# Patient Record
Sex: MALE | Race: WHITE | Employment: FULL TIME | URBAN - METROPOLITAN AREA
[De-identification: names, ages, dates, MRNs, and addresses within clinical notes are randomized per-mention and may not be internally consistent; named-entity substitution may affect disease eponyms.]

---

## 2020-05-29 ENCOUNTER — VIRTUAL VISIT (OUTPATIENT)
Dept: FAMILY MEDICINE | Facility: CLINIC | Age: 30
End: 2020-05-29
Payer: COMMERCIAL

## 2020-05-29 DIAGNOSIS — E55.9 VITAMIN D DEFICIENCY: ICD-10-CM

## 2020-05-29 DIAGNOSIS — R29.898 UPPER EXTREMITY WEAKNESS: Primary | ICD-10-CM

## 2020-05-29 DIAGNOSIS — G62.9 NEUROPATHY: ICD-10-CM

## 2020-05-29 DIAGNOSIS — G43.909 HEMICRANIA: ICD-10-CM

## 2020-05-29 PROCEDURE — 99204 OFFICE O/P NEW MOD 45 MIN: CPT | Mod: 95 | Performed by: FAMILY MEDICINE

## 2020-05-29 RX ORDER — PAROXETINE 10 MG/1
TABLET, FILM COATED ORAL
COMMUNITY
Start: 2020-03-10

## 2020-05-29 RX ORDER — TRAZODONE HYDROCHLORIDE 100 MG/1
1 TABLET ORAL
COMMUNITY
Start: 2019-07-25

## 2020-05-29 RX ORDER — METOPROLOL SUCCINATE 25 MG/1
1 TABLET, EXTENDED RELEASE ORAL
COMMUNITY
Start: 2019-07-01

## 2020-05-29 NOTE — PROGRESS NOTES
"Min Solorio is a 29 year old male who is being evaluated via a billable video visit.      The patient has been notified of following:     \"This video visit will be conducted via a call between you and your physician/provider. We have found that certain health care needs can be provided without the need for an in-person physical exam.  This service lets us provide the care you need with a video conversation.  If a prescription is necessary we can send it directly to your pharmacy.  If lab work is needed we can place an order for that and you can then stop by our lab to have the test done at a later time.    Video visits are billed at different rates depending on your insurance coverage.  Please reach out to your insurance provider with any questions.    If during the course of the call the physician/provider feels a video visit is not appropriate, you will not be charged for this service.\"    Patient has given verbal consent for Video visit? Yes    How would you like to obtain your AVS? Kavita    Patient would like the video invitation sent by: Send to e-mail at: [email or web address]    Will anyone else be joining your video visit? No      Subjective     Min Solorio is a 29 year old male who presents today via video visit for the following health issues:    HPI  Headaches      Duration: x 3 weeks     Description  Location: unilateral in the left frontal area, unilateral in the left occipital area   Character: throbbing pain in the front , squeezing pain in back   Frequency:  Intermittent -usually coincides when neck pain and dizziness       Intensity:  moderate, severe    Accompanying signs and symptoms: would like a CT imaging     Precipitating or Alleviating factors:  Nausea/vomiting: occasionally  Nausea   Dizziness: always- x years   Weakness or numbness: usually- losing  in the fingers   Visual changes: none    History  Head trauma: no   Family history of migraines: no   Previous tests for headaches: " no   Neurologist evaluations: no     Able to do daily activities when headache present: no, needs to rest   Wake with headaches: no   Daily pain medication use: YES- advil   Any changes in: n/a     Precipitating or Alleviating factors (light/sound/sleep/caffeine):     Therapies tried and outcome: Ibuprofen (Advil, Motrin)    Outcome - usually effective  Frequent/daily pain medication use: no           Video Start Time: 12:12      There is no problem list on file for this patient.    No past surgical history on file.    Social History     Tobacco Use     Smoking status: Not on file   Substance Use Topics     Alcohol use: Not on file     No family history on file.      Current Outpatient Medications   Medication Sig Dispense Refill     metoprolol succinate ER (TOPROL-XL) 25 MG 24 hr tablet Take 1 tablet by mouth       PARoxetine (PAXIL) 10 MG tablet        traZODone (DESYREL) 100 MG tablet Take 1 tablet by mouth       No Known Allergies  No lab results found.   BP Readings from Last 3 Encounters:   No data found for BP    Wt Readings from Last 3 Encounters:   No data found for Wt                    Reviewed and updated as needed this visit by Provider         Review of Systems   Constitutional, HEENT, cardiovascular, pulmonary, gi and gu systems are negative, except as otherwise noted.      Objective    There were no vitals taken for this visit.  There is no height or weight on file to calculate BMI.  Physical Exam     GENERAL: Healthy, alert and no distress  EYES: Eyes grossly normal to inspection.  No discharge or erythema, or obvious scleral/conjunctival abnormalities.  RESP: No audible wheeze, cough, or visible cyanosis.  No visible retractions or increased work of breathing.    SKIN: Visible skin clear. No significant rash, abnormal pigmentation or lesions.  NEURO: Cranial nerves grossly intact.  Mentation and speech appropriate for age.  PSYCH: Mentation appears normal, affect normal/bright, judgement and  insight intact, normal speech and appearance well-groomed.      Diagnostic Test Results:  Labs reviewed in Epic        Assessment & Plan       ICD-10-CM    1. Upper extremity weakness  R29.898 CBC with platelets and differential     Ferritin     Iron and iron binding capacity     TSH with free T4 reflex     Vitamin D Deficiency     CT Head w/o Contrast   2. Hemicrania  G43.909 CBC with platelets and differential     Ferritin     Iron and iron binding capacity     TSH with free T4 reflex     Vitamin D Deficiency     CT Head w/o Contrast   3. Vitamin D deficiency  E55.9 CBC with platelets and differential     Ferritin     Iron and iron binding capacity     TSH with free T4 reflex     Vitamin D Deficiency     CT Head w/o Contrast   4. Neuropathy  G62.9 CBC with platelets and differential     Ferritin     Iron and iron binding capacity     TSH with free T4 reflex     Vitamin D Deficiency     CT Head w/o Contrast      started having HA for 2-3 months with worsening weakness on Lt upper extremities with strngth and jnubmness   Has blurry visit and next stiffness on left side  Has past h/o low vitamin D  Will have him to check lab and head CT for further evaluation     FUTURE APPOINTMENTS:       - Follow-up visit in 2 weeks after CT and lab done, will consider starting migraine med if indicated     No follow-ups on file.    J Luis Cordova MD  [facility]      Video-Visit Details    Type of service:  Video Visit    Video End Time:12:32    Originating Location (pt. Location): Home    Distant Location (provider location):  [id number]     Platform used for Video Visit: [email or web address]    No follow-ups on file.       J Luis Cordova MD

## 2020-06-03 DIAGNOSIS — E55.9 VITAMIN D DEFICIENCY: ICD-10-CM

## 2020-06-03 DIAGNOSIS — G62.9 NEUROPATHY: ICD-10-CM

## 2020-06-03 DIAGNOSIS — G43.909 HEMICRANIA: ICD-10-CM

## 2020-06-03 DIAGNOSIS — R29.898 UPPER EXTREMITY WEAKNESS: ICD-10-CM

## 2020-06-03 LAB
BASOPHILS # BLD AUTO: 0 10E9/L (ref 0–0.2)
BASOPHILS NFR BLD AUTO: 0.7 %
DIFFERENTIAL METHOD BLD: NORMAL
EOSINOPHIL # BLD AUTO: 0.1 10E9/L (ref 0–0.7)
EOSINOPHIL NFR BLD AUTO: 2.2 %
ERYTHROCYTE [DISTWIDTH] IN BLOOD BY AUTOMATED COUNT: 12 % (ref 10–15)
HCT VFR BLD AUTO: 43.1 % (ref 40–53)
HGB BLD-MCNC: 15.1 G/DL (ref 13.3–17.7)
LYMPHOCYTES # BLD AUTO: 2.3 10E9/L (ref 0.8–5.3)
LYMPHOCYTES NFR BLD AUTO: 38.8 %
MCH RBC QN AUTO: 33 PG (ref 26.5–33)
MCHC RBC AUTO-ENTMCNC: 35 G/DL (ref 31.5–36.5)
MCV RBC AUTO: 94 FL (ref 78–100)
MONOCYTES # BLD AUTO: 0.5 10E9/L (ref 0–1.3)
MONOCYTES NFR BLD AUTO: 8.8 %
NEUTROPHILS # BLD AUTO: 2.9 10E9/L (ref 1.6–8.3)
NEUTROPHILS NFR BLD AUTO: 49.5 %
PLATELET # BLD AUTO: 232 10E9/L (ref 150–450)
RBC # BLD AUTO: 4.57 10E12/L (ref 4.4–5.9)
WBC # BLD AUTO: 5.9 10E9/L (ref 4–11)

## 2020-06-03 PROCEDURE — 82728 ASSAY OF FERRITIN: CPT | Performed by: FAMILY MEDICINE

## 2020-06-03 PROCEDURE — 83540 ASSAY OF IRON: CPT | Performed by: FAMILY MEDICINE

## 2020-06-03 PROCEDURE — 84443 ASSAY THYROID STIM HORMONE: CPT | Performed by: FAMILY MEDICINE

## 2020-06-03 PROCEDURE — 85025 COMPLETE CBC W/AUTO DIFF WBC: CPT | Performed by: FAMILY MEDICINE

## 2020-06-03 PROCEDURE — 82306 VITAMIN D 25 HYDROXY: CPT | Performed by: FAMILY MEDICINE

## 2020-06-03 PROCEDURE — 83550 IRON BINDING TEST: CPT | Performed by: FAMILY MEDICINE

## 2020-06-03 PROCEDURE — 36415 COLL VENOUS BLD VENIPUNCTURE: CPT | Performed by: FAMILY MEDICINE

## 2020-06-04 ENCOUNTER — HOSPITAL ENCOUNTER (OUTPATIENT)
Dept: CT IMAGING | Facility: CLINIC | Age: 30
Discharge: HOME | End: 2020-06-04
Attending: FAMILY MEDICINE | Admitting: FAMILY MEDICINE
Payer: COMMERCIAL

## 2020-06-04 DIAGNOSIS — G89.29 CHRONIC NONINTRACTABLE HEADACHE, UNSPECIFIED HEADACHE TYPE: ICD-10-CM

## 2020-06-04 DIAGNOSIS — G93.5 CHIARI MALFORMATION TYPE I (H): Primary | ICD-10-CM

## 2020-06-04 DIAGNOSIS — G62.9 NEUROPATHY: ICD-10-CM

## 2020-06-04 DIAGNOSIS — R29.898 UPPER EXTREMITY WEAKNESS: ICD-10-CM

## 2020-06-04 DIAGNOSIS — G43.909 HEMICRANIA: ICD-10-CM

## 2020-06-04 DIAGNOSIS — E55.9 VITAMIN D DEFICIENCY: ICD-10-CM

## 2020-06-04 DIAGNOSIS — R51.9 CHRONIC NONINTRACTABLE HEADACHE, UNSPECIFIED HEADACHE TYPE: ICD-10-CM

## 2020-06-04 LAB
DEPRECATED CALCIDIOL+CALCIFEROL SERPL-MC: 36 UG/L (ref 20–75)
FERRITIN SERPL-MCNC: 104 NG/ML (ref 26–388)
IRON SATN MFR SERPL: 49 % (ref 15–46)
IRON SERPL-MCNC: 147 UG/DL (ref 35–180)
TIBC SERPL-MCNC: 302 UG/DL (ref 240–430)
TSH SERPL DL<=0.005 MIU/L-ACNC: 2.95 MU/L (ref 0.4–4)

## 2020-06-04 PROCEDURE — 70450 CT HEAD/BRAIN W/O DYE: CPT

## 2020-06-11 ENCOUNTER — OFFICE VISIT (OUTPATIENT)
Dept: NEUROSURGERY | Facility: CLINIC | Age: 30
End: 2020-06-11
Attending: PHYSICIAN ASSISTANT
Payer: COMMERCIAL

## 2020-06-11 VITALS
SYSTOLIC BLOOD PRESSURE: 133 MMHG | OXYGEN SATURATION: 98 % | DIASTOLIC BLOOD PRESSURE: 81 MMHG | HEART RATE: 60 BPM | BODY MASS INDEX: 25.86 KG/M2 | HEIGHT: 75 IN | WEIGHT: 208 LBS | TEMPERATURE: 98.3 F

## 2020-06-11 DIAGNOSIS — G93.5 CHIARI I MALFORMATION (H): Primary | ICD-10-CM

## 2020-06-11 PROCEDURE — G0463 HOSPITAL OUTPT CLINIC VISIT: HCPCS

## 2020-06-11 PROCEDURE — 99244 OFF/OP CNSLTJ NEW/EST MOD 40: CPT | Performed by: PHYSICIAN ASSISTANT

## 2020-06-11 ASSESSMENT — MIFFLIN-ST. JEOR: SCORE: 1991.17

## 2020-06-11 ASSESSMENT — PAIN SCALES - GENERAL: PAINLEVEL: SEVERE PAIN (6)

## 2020-06-11 NOTE — LETTER
6/11/2020         RE: Min Solorio  [address]  [facility] [address]        Dear Colleague,    Thank you for referring your patient, Min Solorio, to the [facility]. Please see a copy of my visit note below.    Dr. Stevie Logan  [facility]  Neurosurgery Clinic Visit      CC: headaches, dizziness    Primary care Provider: [facility]    Referring Provider:  J Luis Cordova MD      Reason For Visit:   I was asked to consult on the patient for headaches and dizziness.      HPI: Min Solorio is a 29 year old male who presents for evaluation of his chief complaint of headaches and dizziness.  He states that he has had symptoms for about 1 year, with frequent dizziness and headaches.  He states that he feels intense headaches in the occipital region, which then radiated into the frontal area.  He also noticed some numbness and tingling that spreads from his shoulders into his left more than right arm.  He states that he has been told that his symptoms are related to his anxiety.  He then experienced an exacerbation of his symptoms earlier this spring, with more intense headaches and neck pain and stiffness.      Past Medical History reviewed with patient during visit.    Past Surgical History reviewed with patient during visit.    Current Outpatient Medications   Medication     metoprolol succinate ER (TOPROL-XL) 25 MG 24 hr tablet     PARoxetine (PAXIL) 10 MG tablet     traZODone (DESYREL) 100 MG tablet     No current facility-administered medications for this visit.        No Known Allergies    Social History     Socioeconomic History     Marital status: Single     Spouse name: None     Number of children: None     Years of education: None     Highest education level: None   Occupational History     None   Social Needs     Financial resource strain: None     Food insecurity     Worry: None     Inability: None     Transportation needs  "    Medical: None     Non-medical: None   Tobacco Use     Smoking status: Never Smoker     Smokeless tobacco: Never Used   Substance and Sexual Activity     Alcohol use: None     Drug use: None     Sexual activity: None   Lifestyle     Physical activity     Days per week: None     Minutes per session: None     Stress: None   Relationships     Social connections     Talks on phone: None     Gets together: None     Attends Qu[employer] service: None     Active member of club or organization: None     Attends meetings of clubs or organizations: None     Relationship status: None     Intimate partner violence     Fear of current or ex partner: None     Emotionally abused: None     Physically abused: None     Forced sexual activity: None   Other Topics Concern     Parent/sibling w/ CABG, MI or angioplasty before 65F 55M? Not Asked   Social History Narrative     None       No family history on file.       ROS: 10 point ROS neg other than the symptoms noted above in the HPI.    Vital Signs: /81   Pulse 60   Temp 98.3  F (36.8  C)   Ht 1.892 m (6' 2.5\")   Wt 94.3 kg (208 lb)   SpO2 98%   BMI 26.35 kg/m      Examination:  Constitutional:  Alert, well nourished, NAD.  HEENT: Normocephalic, atraumatic.   Pulmonary:  Without shortness of breath, normal effort.   Lymph: no lymphadenopathy to low back or LE.   Integumentary: Skin is free of rashes or lesions.   Cardiovascular:  No pitting edema of BLE.    Psych: Normal affect, no apparent distress    Neurological:  Awake  Alert  Oriented x 3  Speech clear  Cranial nerves II - XII grossly intact  Motor exam   Shoulder Abduction:  Right:  5/5   Left:  5/5  Biceps:                      Right:  5/5   Left:  5/5  Triceps:                     Right:  5/5   Left:  5/5  Wrist Extensors:       Right:  5/5   Left:  5/5  Wrist Flexors:           Right:  5/5   Left:  5/5  Sensation normal to bilateral upper and lower extremities.    Reflexes are 2+ in the brachial radialis and " triceps. Negative Jluis sign bilaterally.  Musculoskeletal:  Gait: Able to stand from a seated position. Normal non-antalgic, non-myelopathic gait.  Cervical examination reveals good range of motion.  No tenderness to palpation of the cervical spine or paraspinous muscles bilaterally.      Imaging:   IMPRESSION:   1. Possible Chiari I malformation.  2. Otherwise, normal head CT. No evidence for acute intracranial  pathology.    Assessment/Plan:     POssible Chiari I malformation      Min Solorio is a 29 year old male.  I did have a discussion with the patient regarding his history and CT findings.  He does understand there is possibly a Chiari malformation.  Many of his symptoms are consistent with this given his description of his headaches and paresthesias.  For further evaluation of this, we will obtain cervical spine and brain MRIs.  He would like to be notified by phone of the results.  If there is less than 5 mm of protrusion, then conservative management is recommended.  If this is the case, we will place a referral to neurology.  He voiced agreement and understanding.          Mynor Enamorado PA-C  [facility] [place] [address]    Tel [phone]  Pager [phone]      Again, thank you for allowing me to participate in the care of your patient.        Sincerely,        Mynor Enamorado PA-C

## 2020-06-11 NOTE — NURSING NOTE
"Min Solorio is a 29 year old male who presents for:  Chief Complaint   Patient presents with     Neurologic Problem     Chiari malformation / Chronic headache         Initial Vitals:  /81   Pulse 60   Temp 98.3  F (36.8  C)   Ht 6' 2.5\" (1.892 m)   Wt 208 lb (94.3 kg)   SpO2 98%   BMI 26.35 kg/m   Estimated body mass index is 26.35 kg/m  as calculated from the following:    Height as of this encounter: 6' 2.5\" (1.892 m).    Weight as of this encounter: 208 lb (94.3 kg).. Body surface area is 2.23 meters squared. BP completed using cuff size: regular  Severe Pain (6)    Nursing Comments: Patient presents w/ head pain sensation radiates down to left arm    Pablo Tom MA  "

## 2020-06-11 NOTE — PROGRESS NOTES
Dr. Stevie Logan  [facility]  Neurosurgery Clinic Visit      CC: headaches, dizziness    Primary care Provider: [facility]    Referring Provider:  J Luis Cordova MD      Reason For Visit:   I was asked to consult on the patient for headaches and dizziness.      HPI: Min Solorio is a 29 year old male who presents for evaluation of his chief complaint of headaches and dizziness.  He states that he has had symptoms for about 1 year, with frequent dizziness and headaches.  He states that he feels intense headaches in the occipital region, which then radiated into the frontal area.  He also noticed some numbness and tingling that spreads from his shoulders into his left more than right arm.  He states that he has been told that his symptoms are related to his anxiety.  He then experienced an exacerbation of his symptoms earlier this spring, with more intense headaches and neck pain and stiffness.      Past Medical History reviewed with patient during visit.    Past Surgical History reviewed with patient during visit.    Current Outpatient Medications   Medication     metoprolol succinate ER (TOPROL-XL) 25 MG 24 hr tablet     PARoxetine (PAXIL) 10 MG tablet     traZODone (DESYREL) 100 MG tablet     No current facility-administered medications for this visit.        No Known Allergies    Social History     Socioeconomic History     Marital status: Single     Spouse name: None     Number of children: None     Years of education: None     Highest education level: None   Occupational History     None   Social Needs     Financial resource strain: None     Food insecurity     Worry: None     Inability: None     Transportation needs     Medical: None     Non-medical: None   Tobacco Use     Smoking status: Never Smoker     Smokeless tobacco: Never Used   Substance and Sexual Activity     Alcohol use: None     Drug use: None     Sexual activity: None   Lifestyle     Physical activity      "Days per week: None     Minutes per session: None     Stress: None   Relationships     Social connections     Talks on phone: None     Gets together: None     Attends Evangelical service: None     Active member of club or organization: None     Attends meetings of clubs or organizations: None     Relationship status: None     Intimate partner violence     Fear of current or ex partner: None     Emotionally abused: None     Physically abused: None     Forced sexual activity: None   Other Topics Concern     Parent/sibling w/ CABG, MI or angioplasty before 65F 55M? Not Asked   Social History Narrative     None       No family history on file.       ROS: 10 point ROS neg other than the symptoms noted above in the HPI.    Vital Signs: /81   Pulse 60   Temp 98.3  F (36.8  C)   Ht 1.892 m (6' 2.5\")   Wt 94.3 kg (208 lb)   SpO2 98%   BMI 26.35 kg/m      Examination:  Constitutional:  Alert, well nourished, NAD.  HEENT: Normocephalic, atraumatic.   Pulmonary:  Without shortness of breath, normal effort.   Lymph: no lymphadenopathy to low back or LE.   Integumentary: Skin is free of rashes or lesions.   Cardiovascular:  No pitting edema of BLE.    Psych: Normal affect, no apparent distress    Neurological:  Awake  Alert  Oriented x 3  Speech clear  Cranial nerves II - XII grossly intact  Motor exam   Shoulder Abduction:  Right:  5/5   Left:  5/5  Biceps:                      Right:  5/5   Left:  5/5  Triceps:                     Right:  5/5   Left:  5/5  Wrist Extensors:       Right:  5/5   Left:  5/5  Wrist Flexors:           Right:  5/5   Left:  5/5  Sensation normal to bilateral upper and lower extremities.    Reflexes are 2+ in the brachial radialis and triceps. Negative Jluis sign bilaterally.  Musculoskeletal:  Gait: Able to stand from a seated position. Normal non-antalgic, non-myelopathic gait.  Cervical examination reveals good range of motion.  No tenderness to palpation of the cervical spine or " paraspinous muscles bilaterally.      Imaging:   IMPRESSION:   1. Possible Chiari I malformation.  2. Otherwise, normal head CT. No evidence for acute intracranial  pathology.    Assessment/Plan:     POssible Chiari I malformation      Min Solorio is a 29 year old male.  I did have a discussion with the patient regarding his history and CT findings.  He does understand there is possibly a Chiari malformation.  Many of his symptoms are consistent with this given his description of his headaches and paresthesias.  For further evaluation of this, we will obtain cervical spine and brain MRIs.  He would like to be notified by phone of the results.  If there is less than 5 mm of protrusion, then conservative management is recommended.  If this is the case, we will place a referral to neurology.  He voiced agreement and understanding.          Mynor Enamorado PA-C  [facility]  [address] [address]    Tel [phone]  Pager [phone]

## 2020-06-18 DIAGNOSIS — R51.9 HEADACHE, OCCIPITAL: ICD-10-CM

## 2020-06-18 DIAGNOSIS — G93.5 CHIARI I MALFORMATION (H): Primary | ICD-10-CM

## 2020-06-23 ENCOUNTER — TELEPHONE (OUTPATIENT)
Dept: NEUROSURGERY | Facility: CLINIC | Age: 30
End: 2020-06-23

## 2020-07-02 ENCOUNTER — HOSPITAL ENCOUNTER (OUTPATIENT)
Dept: MRI IMAGING | Facility: CLINIC | Age: 30
End: 2020-07-02
Attending: PHYSICIAN ASSISTANT
Payer: COMMERCIAL

## 2020-07-02 DIAGNOSIS — R51.9 HEADACHE, OCCIPITAL: ICD-10-CM

## 2020-07-02 DIAGNOSIS — G93.5 CHIARI I MALFORMATION (H): ICD-10-CM

## 2020-07-02 PROCEDURE — 72141 MRI NECK SPINE W/O DYE: CPT

## 2020-07-02 PROCEDURE — 25500064 ZZH RX 255 OP 636: Performed by: PHYSICIAN ASSISTANT

## 2020-07-02 PROCEDURE — 70553 MRI BRAIN STEM W/O & W/DYE: CPT

## 2020-07-02 PROCEDURE — A9585 GADOBUTROL INJECTION: HCPCS | Performed by: PHYSICIAN ASSISTANT

## 2020-07-02 RX ORDER — GADOBUTROL 604.72 MG/ML
0.1 INJECTION INTRAVENOUS ONCE
Status: COMPLETED | OUTPATIENT
Start: 2020-07-02 | End: 2020-07-02

## 2020-07-02 RX ADMIN — GADOBUTROL 9.4 ML: 604.72 INJECTION INTRAVENOUS at 17:14

## 2020-07-07 ENCOUNTER — TELEPHONE (OUTPATIENT)
Dept: NEUROLOGY | Facility: CLINIC | Age: 30
End: 2020-07-07

## 2020-07-07 NOTE — TELEPHONE ENCOUNTER
Spoke with patient, he will come in to discuss further options with Dr. Logan.       Mynor Enamorado PA-C

## 2020-07-13 ENCOUNTER — OFFICE VISIT (OUTPATIENT)
Dept: NEUROSURGERY | Facility: CLINIC | Age: 30
End: 2020-07-13
Attending: NEUROLOGICAL SURGERY
Payer: COMMERCIAL

## 2020-07-13 VITALS
BODY MASS INDEX: 26.36 KG/M2 | OXYGEN SATURATION: 100 % | SYSTOLIC BLOOD PRESSURE: 119 MMHG | DIASTOLIC BLOOD PRESSURE: 81 MMHG | TEMPERATURE: 98.9 F | HEIGHT: 75 IN | WEIGHT: 212 LBS | HEART RATE: 76 BPM

## 2020-07-13 DIAGNOSIS — G93.5 CHIARI MALFORMATION TYPE I (H): Primary | ICD-10-CM

## 2020-07-13 DIAGNOSIS — G44.89 OTHER HEADACHE SYNDROME: ICD-10-CM

## 2020-07-13 PROCEDURE — 99214 OFFICE O/P EST MOD 30 MIN: CPT | Performed by: NEUROLOGICAL SURGERY

## 2020-07-13 PROCEDURE — G0463 HOSPITAL OUTPT CLINIC VISIT: HCPCS

## 2020-07-13 ASSESSMENT — PAIN SCALES - GENERAL: PAINLEVEL: NO PAIN (1)

## 2020-07-13 ASSESSMENT — MIFFLIN-ST. JEOR: SCORE: 2012.26

## 2020-07-13 NOTE — PROGRESS NOTES
Min Solorio is a 29 year old male who presents for evaluation of his chief complaint of headaches and dizziness.  He states that he has had symptoms for about 1 year, with frequent dizziness and headaches.  He states that he feels intense headaches in the occipital region, which then radiated into the frontal area.  He also noticed some numbness and tingling that spreads from his shoulders into his left more than right arm.  He states that he has been told that his symptoms are related to his anxiety.  He then experienced an exacerbation of his symptoms earlier this spring, with more intense headaches and neck pain and stiffness.    Returns for follow up.  MR Brain and Cervical Spine show Chiari malformation with 9 mm tonsillar herniation, effacement of cisterna magna.  No syrinx.  Nearly daily, throbbing, occipital headaches, with radiation to the right forehead and eye, and neck ache.  Also with left arm paresthesias.    Past Medical History reviewed with patient during visit.    Past Surgical History reviewed with patient during visit.    Current Outpatient Medications   Medication     metoprolol succinate ER (TOPROL-XL) 25 MG 24 hr tablet     PARoxetine (PAXIL) 10 MG tablet     traZODone (DESYREL) 100 MG tablet     No current facility-administered medications for this visit.        No Known Allergies    Social History     Socioeconomic History     Marital status: Single     Spouse name: None     Number of children: None     Years of education: None     Highest education level: None   Occupational History     None   Social Needs     Financial resource strain: None     Food insecurity     Worry: None     Inability: None     Transportation needs     Medical: None     Non-medical: None   Tobacco Use     Smoking status: Never Smoker     Smokeless tobacco: Never Used   Substance and Sexual Activity     Alcohol use: None     Drug use: None     Sexual activity: None   Lifestyle     Physical activity     Days per  "week: None     Minutes per session: None     Stress: None   Relationships     Social connections     Talks on phone: None     Gets together: None     Attends Adventist service: None     Active member of club or organization: None     Attends meetings of clubs or organizations: None     Relationship status: None     Intimate partner violence     Fear of current or ex partner: None     Emotionally abused: None     Physically abused: None     Forced sexual activity: None   Other Topics Concern     Parent/sibling w/ CABG, MI or angioplasty before 65F 55M? Not Asked   Social History Narrative     None       No family history on file.       ROS: 10 point ROS neg other than the symptoms noted above in the HPI.    Vital Signs: /81   Pulse 76   Temp 98.9  F (37.2  C)   Ht 1.905 m (6' 3\")   Wt 96.2 kg (212 lb)   SpO2 100%   BMI 26.50 kg/m      Examination:  Constitutional:  Alert, well nourished, NAD.  HEENT: Normocephalic, atraumatic.   Pulmonary:  Without shortness of breath, normal effort.   Lymph: no lymphadenopathy to low back or LE.   Integumentary: Skin is free of rashes or lesions.   Cardiovascular:  No pitting edema of BLE.    Psych: Normal affect, no apparent distress    Neurological:  Awake  Alert  Oriented x 3  Speech clear  Cranial nerves II - XII grossly intact  Motor exam   Shoulder Abduction:  Right:  5/5   Left:  5/5  Biceps:                      Right:  5/5   Left:  5/5  Triceps:                     Right:  5/5   Left:  5/5  Wrist Extensors:       Right:  5/5   Left:  5/5  Wrist Flexors:           Right:  5/5   Left:  5/5  Sensation normal to bilateral upper and lower extremities.    Reflexes are 2+ in the brachial radialis and triceps. Negative Jluis sign bilaterally.  Musculoskeletal:  Gait: Able to stand from a seated position. Normal non-antalgic, non-myelopathic gait.  Cervical examination reveals good range of motion.  No tenderness to palpation of the cervical spine or paraspinous " muscles bilaterally.      Imaging:   IMPRESSION:   1. Possible Chiari I malformation.  2. Otherwise, normal head CT. No evidence for acute intracranial  pathology.    Assessment/Plan:     Will refer to [facility] for consideration of medical headache management options  Discussed that if he doesn't have improvement, he may ultimately need to consider posterior fossa decompression

## 2020-07-13 NOTE — LETTER
7/13/2020         RE: Min Solorio  [address]  [facility] [address]        Dear Colleague,    Thank you for referring your patient, Min Solorio, to the [facility]. Please see a copy of my visit note below.    Min Solorio is a 29 year old male who presents for evaluation of his chief complaint of headaches and dizziness.  He states that he has had symptoms for about 1 year, with frequent dizziness and headaches.  He states that he feels intense headaches in the occipital region, which then radiated into the frontal area.  He also noticed some numbness and tingling that spreads from his shoulders into his left more than right arm.  He states that he has been told that his symptoms are related to his anxiety.  He then experienced an exacerbation of his symptoms earlier this spring, with more intense headaches and neck pain and stiffness.    Returns for follow up.  MR Brain and Cervical Spine show Chiari malformation with 9 mm tonsillar herniation, effacement of cisterna magna.  No syrinx.  Nearly daily, throbbing, occipital headaches, with radiation to the right forehead and eye, and neck ache.  Also with left arm paresthesias.    Past Medical History reviewed with patient during visit.    Past Surgical History reviewed with patient during visit.    Current Outpatient Medications   Medication     metoprolol succinate ER (TOPROL-XL) 25 MG 24 hr tablet     PARoxetine (PAXIL) 10 MG tablet     traZODone (DESYREL) 100 MG tablet     No current facility-administered medications for this visit.        No Known Allergies    Social History     Socioeconomic History     Marital status: Single     Spouse name: None     Number of children: None     Years of education: None     Highest education level: None   Occupational History     None   Social Needs     Financial resource strain: None     Food insecurity     Worry: None     Inability: None     Transportation needs      "Medical: None     Non-medical: None   Tobacco Use     Smoking status: Never Smoker     Smokeless tobacco: Never Used   Substance and Sexual Activity     Alcohol use: None     Drug use: None     Sexual activity: None   Lifestyle     Physical activity     Days per week: None     Minutes per session: None     Stress: None   Relationships     Social connections     Talks on phone: None     Gets together: None     Attends Tenri[employer] service: None     Active member of club or organization: None     Attends meetings of clubs or organizations: None     Relationship status: None     Intimate partner violence     Fear of current or ex partner: None     Emotionally abused: None     Physically abused: None     Forced sexual activity: None   Other Topics Concern     Parent/sibling w/ CABG, MI or angioplasty before 65F 55M? Not Asked   Social History Narrative     None       No family history on file.       ROS: 10 point ROS neg other than the symptoms noted above in the HPI.    Vital Signs: /81   Pulse 76   Temp 98.9  F (37.2  C)   Ht 1.905 m (6' 3\")   Wt 96.2 kg (212 lb)   SpO2 100%   BMI 26.50 kg/m      Examination:  Constitutional:  Alert, well nourished, NAD.  HEENT: Normocephalic, atraumatic.   Pulmonary:  Without shortness of breath, normal effort.   Lymph: no lymphadenopathy to low back or LE.   Integumentary: Skin is free of rashes or lesions.   Cardiovascular:  No pitting edema of BLE.    Psych: Normal affect, no apparent distress    Neurological:  Awake  Alert  Oriented x 3  Speech clear  Cranial nerves II - XII grossly intact  Motor exam   Shoulder Abduction:  Right:  5/5   Left:  5/5  Biceps:                      Right:  5/5   Left:  5/5  Triceps:                     Right:  5/5   Left:  5/5  Wrist Extensors:       Right:  5/5   Left:  5/5  Wrist Flexors:           Right:  5/5   Left:  5/5  Sensation normal to bilateral upper and lower extremities.    Reflexes are 2+ in the brachial radialis and triceps. " Negative Jluis sign bilaterally.  Musculoskeletal:  Gait: Able to stand from a seated position. Normal non-antalgic, non-myelopathic gait.  Cervical examination reveals good range of motion.  No tenderness to palpation of the cervical spine or paraspinous muscles bilaterally.      Imaging:   IMPRESSION:   1. Possible Chiari I malformation.  2. Otherwise, normal head CT. No evidence for acute intracranial  pathology.    Assessment/Plan:     Will refer to [facility] for consideration of medical headache management options  Discussed that if he doesn't have improvement, he may ultimately need to consider posterior fossa decompression    Again, thank you for allowing me to participate in the care of your patient.        Sincerely,        Stevie Logan MD

## 2020-07-13 NOTE — NURSING NOTE
"Min Solorio is a 30 year old male who presents for:  Chief Complaint   Patient presents with     Neurologic Problem     Chiari I malformation neck pain w/ (dizziness / headaches)        Initial Vitals:  /81   Pulse 76   Temp 98.9  F (37.2  C)   Ht 6' 3\" (1.905 m)   Wt 212 lb (96.2 kg)   SpO2 100%   BMI 26.50 kg/m   Estimated body mass index is 26.5 kg/m  as calculated from the following:    Height as of this encounter: 6' 3\" (1.905 m).    Weight as of this encounter: 212 lb (96.2 kg).. Body surface area is 2.26 meters squared. BP completed using cuff size: regular  No Pain (1)    Nursing Comments: Patient presents for dizziness    Pablo Tom MA  "

## 2020-08-11 ENCOUNTER — TRANSFERRED RECORDS (OUTPATIENT)
Dept: HEALTH INFORMATION MANAGEMENT | Facility: CLINIC | Age: 30
End: 2020-08-11

## 2020-10-16 ENCOUNTER — TRANSFERRED RECORDS (OUTPATIENT)
Dept: HEALTH INFORMATION MANAGEMENT | Facility: CLINIC | Age: 30
End: 2020-10-16

## 2021-01-04 ENCOUNTER — HEALTH MAINTENANCE LETTER (OUTPATIENT)
Age: 31
End: 2021-01-04

## 2021-02-20 ENCOUNTER — HOSPITAL ENCOUNTER (EMERGENCY)
Facility: CLINIC | Age: 31
Discharge: HOME | End: 2021-02-20
Attending: EMERGENCY MEDICINE | Admitting: EMERGENCY MEDICINE
Payer: COMMERCIAL

## 2021-02-20 VITALS
RESPIRATION RATE: 16 BRPM | SYSTOLIC BLOOD PRESSURE: 100 MMHG | TEMPERATURE: 98 F | OXYGEN SATURATION: 99 % | DIASTOLIC BLOOD PRESSURE: 57 MMHG | HEART RATE: 70 BPM

## 2021-02-20 DIAGNOSIS — F22 DELUSIONS (H): ICD-10-CM

## 2021-02-20 LAB
AMPHETAMINES UR QL SCN: NEGATIVE
ANION GAP SERPL CALCULATED.3IONS-SCNC: 4 MMOL/L (ref 3–14)
BARBITURATES UR QL: POSITIVE
BASOPHILS # BLD AUTO: 0.1 10E9/L (ref 0–0.2)
BASOPHILS NFR BLD AUTO: 0.9 %
BENZODIAZ UR QL: NEGATIVE
BUN SERPL-MCNC: 17 MG/DL (ref 7–30)
CALCIUM SERPL-MCNC: 9.5 MG/DL (ref 8.5–10.1)
CANNABINOIDS UR QL SCN: NEGATIVE
CHLORIDE SERPL-SCNC: 104 MMOL/L (ref 94–109)
CO2 SERPL-SCNC: 28 MMOL/L (ref 20–32)
COCAINE UR QL: NEGATIVE
CREAT SERPL-MCNC: 1.01 MG/DL (ref 0.66–1.25)
DIFFERENTIAL METHOD BLD: NORMAL
EOSINOPHIL # BLD AUTO: 0 10E9/L (ref 0–0.7)
EOSINOPHIL NFR BLD AUTO: 0 %
ERYTHROCYTE [DISTWIDTH] IN BLOOD BY AUTOMATED COUNT: 12.3 % (ref 10–15)
GFR SERPL CREATININE-BSD FRML MDRD: >90 ML/MIN/{1.73_M2}
GLUCOSE SERPL-MCNC: 108 MG/DL (ref 70–99)
HCT VFR BLD AUTO: 44 % (ref 40–53)
HGB BLD-MCNC: 15.4 G/DL (ref 13.3–17.7)
IMM GRANULOCYTES # BLD: 0 10E9/L (ref 0–0.4)
IMM GRANULOCYTES NFR BLD: 0.3 %
LYMPHOCYTES # BLD AUTO: 1.1 10E9/L (ref 0.8–5.3)
LYMPHOCYTES NFR BLD AUTO: 17.1 %
MCH RBC QN AUTO: 31.8 PG (ref 26.5–33)
MCHC RBC AUTO-ENTMCNC: 35 G/DL (ref 31.5–36.5)
MCV RBC AUTO: 91 FL (ref 78–100)
MONOCYTES # BLD AUTO: 0.5 10E9/L (ref 0–1.3)
MONOCYTES NFR BLD AUTO: 7.8 %
NEUTROPHILS # BLD AUTO: 4.8 10E9/L (ref 1.6–8.3)
NEUTROPHILS NFR BLD AUTO: 73.9 %
NRBC # BLD AUTO: 0 10*3/UL
NRBC BLD AUTO-RTO: 0 /100
OPIATES UR QL SCN: NEGATIVE
PCP UR QL SCN: NEGATIVE
PLATELET # BLD AUTO: 275 10E9/L (ref 150–450)
POTASSIUM SERPL-SCNC: 3.8 MMOL/L (ref 3.4–5.3)
RBC # BLD AUTO: 4.84 10E12/L (ref 4.4–5.9)
SODIUM SERPL-SCNC: 136 MMOL/L (ref 133–144)
WBC # BLD AUTO: 6.4 10E9/L (ref 4–11)

## 2021-02-20 PROCEDURE — 90791 PSYCH DIAGNOSTIC EVALUATION: CPT

## 2021-02-20 PROCEDURE — 85025 COMPLETE CBC W/AUTO DIFF WBC: CPT | Performed by: EMERGENCY MEDICINE

## 2021-02-20 PROCEDURE — 99285 EMERGENCY DEPT VISIT HI MDM: CPT | Mod: 25

## 2021-02-20 PROCEDURE — 80048 BASIC METABOLIC PNL TOTAL CA: CPT | Performed by: EMERGENCY MEDICINE

## 2021-02-20 PROCEDURE — 80307 DRUG TEST PRSMV CHEM ANLYZR: CPT | Performed by: EMERGENCY MEDICINE

## 2021-02-20 ASSESSMENT — ENCOUNTER SYMPTOMS
SLEEP DISTURBANCE: 1
NERVOUS/ANXIOUS: 1

## 2021-02-20 NOTE — ED NOTES
Writer contacted staff at [facility]: [phone].  The stated patient is fine to return to them once he is medically cleared.
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
## 2021-02-20 NOTE — ED NOTES
Spoke with staff at [facility] and they will work on getting patient a ride back to the facility.

## 2021-02-20 NOTE — ED NOTES
Bed: ED16  Expected date:   Expected time:   Means of arrival:   Comments:  [facility] inhaled unknown drug yesterday and wants to be checked out 29 m

## 2021-02-20 NOTE — ED NOTES
Patient walked out front for discharge, family/friend Marielle to bring patient back to recovery center.

## 2021-03-15 ENCOUNTER — OFFICE VISIT (OUTPATIENT)
Dept: FAMILY MEDICINE | Facility: CLINIC | Age: 31
End: 2021-03-15
Payer: COMMERCIAL

## 2021-03-15 VITALS
OXYGEN SATURATION: 96 % | HEIGHT: 75 IN | WEIGHT: 197 LBS | DIASTOLIC BLOOD PRESSURE: 78 MMHG | BODY MASS INDEX: 24.49 KG/M2 | TEMPERATURE: 98.5 F | HEART RATE: 90 BPM | SYSTOLIC BLOOD PRESSURE: 108 MMHG

## 2021-03-15 DIAGNOSIS — M62.838 MUSCLE SPASM: ICD-10-CM

## 2021-03-15 DIAGNOSIS — M62.838 NECK MUSCLE SPASM: ICD-10-CM

## 2021-03-15 DIAGNOSIS — G93.5 CHIARI MALFORMATION TYPE I (H): ICD-10-CM

## 2021-03-15 DIAGNOSIS — F19.20 CHEMICAL DEPENDENCY (H): Primary | ICD-10-CM

## 2021-03-15 PROCEDURE — 99214 OFFICE O/P EST MOD 30 MIN: CPT | Performed by: FAMILY MEDICINE

## 2021-03-15 RX ORDER — GABAPENTIN 300 MG/1
CAPSULE ORAL
COMMUNITY
Start: 2021-02-25

## 2021-03-15 RX ORDER — BACLOFEN 10 MG/1
10 TABLET ORAL 3 TIMES DAILY
Status: CANCELLED | OUTPATIENT
Start: 2021-03-15

## 2021-03-15 RX ORDER — BACLOFEN 10 MG/1
TABLET ORAL
Qty: 42 TABLET | Refills: 0 | Status: SHIPPED | OUTPATIENT
Start: 2021-03-15 | End: 2021-04-05

## 2021-03-15 RX ORDER — BACLOFEN 10 MG/1
10 TABLET ORAL 3 TIMES DAILY
COMMUNITY

## 2021-03-15 RX ORDER — METAXALONE 800 MG/1
TABLET ORAL
Qty: 42 TABLET | Refills: 0 | Status: SHIPPED | OUTPATIENT
Start: 2021-03-15 | End: 2021-03-19

## 2021-03-15 ASSESSMENT — MIFFLIN-ST. JEOR: SCORE: 1944.22

## 2021-03-15 NOTE — PROGRESS NOTES
Assessment & Plan     Chemical dependency (H)  (primary encounter diagnosis)  Comment: improving, went out of in house treatment program, and starting day program for next 2 weeks,   Pt want to RTW while on day program , letter for work written   Plan: mentioned above     Chiari malformation type I (H)  Has neck spasm, started baclofen from neurology 1 week ago, pt wants to try different med, will do cross tapering/ramping between baclofen and skelaxin  - baclofen (LIORESAL) 10 MG tablet; Take 0.5 tablets (5 mg) by mouth 3 times daily for 7 days, THEN 0.5 tablets (5 mg) 2 times daily for 7 days, THEN 0.5 tablets (5 mg) daily for 7 days.  - metaxalone (SKELAXIN) 800 MG tablet; Take 0.5 tablets (400 mg) by mouth daily for 7 days, THEN 0.5 tablets (400 mg) 2 times daily for 7 days, THEN 0.5 tablets (400 mg) 3 times daily for 7 days, THEN 1 tablet (800 mg) 3 times daily for 7 days.    Neck muscle spasm  Mentioned above   - baclofen (LIORESAL) 10 MG tablet; Take 0.5 tablets (5 mg) by mouth 3 times daily for 7 days, THEN 0.5 tablets (5 mg) 2 times daily for 7 days, THEN 0.5 tablets (5 mg) daily for 7 days.  - metaxalone (SKELAXIN) 800 MG tablet; Take 0.5 tablets (400 mg) by mouth daily for 7 days, THEN 0.5 tablets (400 mg) 2 times daily for 7 days, THEN 0.5 tablets (400 mg) 3 times daily for 7 days, THEN 1 tablet (800 mg) 3 times daily for 7 days.    Muscle spasm  mentioned above   - baclofen (LIORESAL) 10 MG tablet; Take 0.5 tablets (5 mg) by mouth 3 times daily for 7 days, THEN 0.5 tablets (5 mg) 2 times daily for 7 days, THEN 0.5 tablets (5 mg) daily for 7 days.  - metaxalone (SKELAXIN) 800 MG tablet; Take 0.5 tablets (400 mg) by mouth daily for 7 days, THEN 0.5 tablets (400 mg) 2 times daily for 7 days, THEN 0.5 tablets (400 mg) 3 times daily for 7 days, THEN 1 tablet (800 mg) 3 times daily for 7 days.      35 minutes spent on the date of the encounter doing chart review, history and exam, documentation and  "further activities as noted above       FUTURE APPOINTMENTS:       - Follow-up visit in 1 month for med check     No follow-ups on file.    J Luis Cordova MD  [facility] LEONARDO Copeland is a 30 year old who presents for the following health issues     HPI     Concern - Return to work, and then a refill on the bacolfen          Review of Systems   Constitutional, HEENT, cardiovascular, pulmonary, gi and gu systems are negative, except as otherwise noted.      Objective    /78   Pulse 90   Temp 98.5  F (36.9  C) (Tympanic)   Ht 1.905 m (6' 3\")   Wt 89.4 kg (197 lb)   SpO2 96%   BMI 24.62 kg/m    Body mass index is 24.62 kg/m .  Physical Exam   GENERAL: healthy, alert and no distress  EYES: Eyes grossly normal to inspection, PERRL and conjunctivae and sclerae normal  HENT: ear canals and TM's normal, nose and mouth without ulcers or lesions  NECK: no adenopathy, no asymmetry, masses, or scars and thyroid normal to palpation  RESP: lungs clear to auscultation - no rales, rhonchi or wheezes  CV: regular rate and rhythm, normal S1 S2, no S3 or S4, no murmur, click or rub, no peripheral edema and peripheral pulses strong  ABDOMEN: soft, nontender, no hepatosplenomegaly, no masses and bowel sounds normal  MS: no gross musculoskeletal defects noted, no edema  SKIN: no suspicious lesions or rashes  NEURO: Normal strength and tone, mentation intact and speech normal                "

## 2021-03-17 ENCOUNTER — TELEPHONE (OUTPATIENT)
Dept: FAMILY MEDICINE | Facility: CLINIC | Age: 31
End: 2021-03-17

## 2021-03-17 DIAGNOSIS — M62.838 NECK MUSCLE SPASM: Primary | ICD-10-CM

## 2021-03-17 DIAGNOSIS — M62.838 MUSCLE SPASM: ICD-10-CM

## 2021-03-17 NOTE — TELEPHONE ENCOUNTER
Prior Authorization Retail Medication Request    Medication/Dose: metaxalone (SKELAXIN) 800 MG tablet  ICD code (if different than what is on RX):    Previously Tried and Failed:    Rationale:      Insurance Name:  NA  Insurance ID:  NA      Pharmacy Information (if different than what is on RX)  Name:  same  Phone:  same

## 2021-03-18 NOTE — TELEPHONE ENCOUNTER
Central Prior Authorization Team   Phone: [phone]    PA Initiation    Medication: metaxalone (SKELAXIN) 800 MG tablet  Insurance Company: [employer] [place] - Phone [phone] Fax [phone]  Pharmacy Filling the Rx: [id number] - [place], [place] - [address] [facility] [address]  Filling Pharmacy Phone: [phone]  Filling Pharmacy Fax: [phone]  Start Date: 3/18/2021

## 2021-03-19 RX ORDER — METHOCARBAMOL 500 MG/1
TABLET, FILM COATED ORAL
Qty: 74 TABLET | Refills: 0 | Status: SHIPPED | OUTPATIENT
Start: 2021-03-19 | End: 2021-04-16

## 2021-03-19 NOTE — TELEPHONE ENCOUNTER
Detailed message left with info from provider and return number to call with any questions or concerns.    Ibeth ABREU RN  EP Triage

## 2021-03-19 NOTE — TELEPHONE ENCOUNTER
PRIOR AUTHORIZATION DENIED    Medication: metaxalone (SKELAXIN) 800 MG-DENIED    Denial Date: 3/18/2021    Denial Rational: PATIENT MUST TRY/FAIL THREE FORMULARY ALTERNATIVES (PATIENT HAS FAILED ONE - BACLOFEN) - CHLORZOXAZONE TAB, CYCLOBENZAPRINE TAB, METHOCARBAMOL TAB.         Appeal Information:  IF PATIENT IS UNABLE TO TRY/FAIL ALTERNATIVE(S) PLEASE SUPPLY PA TEAM WITH A LETTER OF MEDICAL NECESSITY WITH CLINICAL REASON.

## 2021-04-26 RX ORDER — BACLOFEN 10 MG/1
TABLET ORAL
Qty: 42 TABLET | OUTPATIENT
Start: 2021-04-26

## 2021-04-26 NOTE — TELEPHONE ENCOUNTER
Failed protocol.  please route to  team if patient needs an appointment     Ev MILLERRN BSN  [facility]  [phone]

## 2021-04-26 NOTE — TELEPHONE ENCOUNTER
S/w pt and advised received refill request for baclofen from pharmacy.  Pt states he forgot to take that off auto refill and the medication was changed to methocarbamol. Pt is notifying pharmacy to take baclofen off auto refill.    Nurse denied baclofen to pharmacy advising rx was discontinued.     Ibeth ABREU RN  EP Triage

## 2021-04-26 NOTE — TELEPHONE ENCOUNTER
I believed it was switched to methocarbamol. Please check on with him for switching refill     thx

## 2021-09-27 ENCOUNTER — TRANSFERRED RECORDS (OUTPATIENT)
Dept: HEALTH INFORMATION MANAGEMENT | Facility: CLINIC | Age: 31
End: 2021-09-27

## 2021-10-10 ENCOUNTER — HEALTH MAINTENANCE LETTER (OUTPATIENT)
Age: 31
End: 2021-10-10

## 2022-01-30 ENCOUNTER — HEALTH MAINTENANCE LETTER (OUTPATIENT)
Age: 32
End: 2022-01-30

## 2022-09-24 ENCOUNTER — HEALTH MAINTENANCE LETTER (OUTPATIENT)
Age: 32
End: 2022-09-24

## 2023-05-08 ENCOUNTER — HEALTH MAINTENANCE LETTER (OUTPATIENT)
Age: 33
End: 2023-05-08